# Patient Record
Sex: MALE | URBAN - METROPOLITAN AREA
[De-identification: names, ages, dates, MRNs, and addresses within clinical notes are randomized per-mention and may not be internally consistent; named-entity substitution may affect disease eponyms.]

---

## 2022-07-19 ENCOUNTER — OFFICE VISIT (OUTPATIENT)
Dept: URBAN - METROPOLITAN AREA CLINIC 6 | Facility: CLINIC | Age: 57
End: 2022-07-19
Payer: MEDICARE

## 2022-07-19 DIAGNOSIS — H25.13 AGE-RELATED NUCLEAR CATARACT, BILATERAL: ICD-10-CM

## 2022-07-19 DIAGNOSIS — H40.1134 PRIMARY OPEN-ANGLE GLAUCOMA, INDETERMINATE, BILATERAL: Primary | ICD-10-CM

## 2022-07-19 DIAGNOSIS — H11.153 PINGUECULA, BILATERAL: ICD-10-CM

## 2022-07-19 PROCEDURE — 92250 FUNDUS PHOTOGRAPHY W/I&R: CPT | Performed by: OPTOMETRIST

## 2022-07-19 PROCEDURE — 92004 COMPRE OPH EXAM NEW PT 1/>: CPT | Performed by: OPTOMETRIST

## 2022-07-19 RX ORDER — LATANOPROST 50 UG/ML
0.005 % SOLUTION OPHTHALMIC
Qty: 2.5 | Refills: 6 | Status: ACTIVE
Start: 2022-07-19

## 2022-07-19 ASSESSMENT — INTRAOCULAR PRESSURE
OS: 31
OD: 28

## 2022-07-19 NOTE — IMPRESSION/PLAN
Impression: Primary open-angle glaucoma, indeterminate, bilateral: H40.6104. Plan: Primary Open Angle Glaucoma OU, indeterminate stage - Discussed with the patient that lack of compliance with drops/treatment and follow up visits can result in severe vision loss or blindness. IOP 28/31 today. Start Latanoprost QHS OU.